# Patient Record
(demographics unavailable — no encounter records)

---

## 2025-03-06 NOTE — CONSULT LETTER
[FreeTextEntry3] : Viridiana Harrington MD  Division of Pediatric, General, Thoracic and Endoscopic Surgery  [facility]

## 2025-03-06 NOTE — CONSULT LETTER
[FreeTextEntry3] : Viridiana Harrington MD  Division of Pediatric, General, Thoracic and Endoscopic Surgery  [facility]

## 2025-03-07 NOTE — REASON FOR VISIT
[FreeTextEntry3] : Neck mass [FreeTextEntry4] : Bee Kimble MD [Initial - Scheduled] : an initial, scheduled visit with concerns of [Other: ____] : [unfilled] [Patient] : patient [Mother] : mother

## 2025-03-07 NOTE — ASSESSMENT
[FreeTextEntry1] : Ayesha is a 12-year-old female who presents to the office for evaluation of a possible neck mass.  On exam the area of concern appears to be a prominent junction of the spine at the cervical thoracic junction.  No discrete abnormality is noted on physical exam.  Since we do not have access to outside imaging at today's office visit, we will plan to follow-up with the pediatrician to ensure that there were no other findings on imaging studies.  In the anticipation that there are no other findings, there is no indication for surgical intervention at this time as the area of concern is felt to be a normal variant.  Will plan for surgical follow-up on an as-needed basis.

## 2025-03-07 NOTE — PHYSICAL EXAM
[Acute Distress] : no acute distress [Alert] : alert [Toxic appearing] : well appearing [Normocephalic] : normocephalic [Icteric sclera] : no icteric sclera [FROM] : full range of motion [Normal Respiratory Effort] : normal respiratory efforts [Tender] : not tender [Distended] : not distended [Moves all extremities x4] : moves all extremities x4 [Warm, well perfused x4] : warm, well perfused x4 [Grossly intact] : grossly intact [FreeTextEntry2] : There is prominence of the spine at the cervical thoracic junction.  There are no other palpable masses noted.  There is no erythema, induration, edema, or tenderness on palpation.
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
